# Patient Record
Sex: MALE | HISPANIC OR LATINO | Employment: PART TIME | URBAN - METROPOLITAN AREA
[De-identification: names, ages, dates, MRNs, and addresses within clinical notes are randomized per-mention and may not be internally consistent; named-entity substitution may affect disease eponyms.]

---

## 2017-09-20 ENCOUNTER — HOSPITAL ENCOUNTER (EMERGENCY)
Facility: MEDICAL CENTER | Age: 37
End: 2017-09-20
Attending: EMERGENCY MEDICINE
Payer: COMMERCIAL

## 2017-09-20 VITALS
SYSTOLIC BLOOD PRESSURE: 134 MMHG | RESPIRATION RATE: 18 BRPM | OXYGEN SATURATION: 90 % | WEIGHT: 174.6 LBS | TEMPERATURE: 99.3 F | BODY MASS INDEX: 29.09 KG/M2 | HEIGHT: 65 IN | DIASTOLIC BLOOD PRESSURE: 73 MMHG | HEART RATE: 93 BPM

## 2017-09-20 DIAGNOSIS — L50.9 HIVES: ICD-10-CM

## 2017-09-20 DIAGNOSIS — T78.40XA ALLERGIC REACTION, INITIAL ENCOUNTER: ICD-10-CM

## 2017-09-20 PROCEDURE — 700102 HCHG RX REV CODE 250 W/ 637 OVERRIDE(OP): Performed by: EMERGENCY MEDICINE

## 2017-09-20 PROCEDURE — 99284 EMERGENCY DEPT VISIT MOD MDM: CPT

## 2017-09-20 PROCEDURE — 700111 HCHG RX REV CODE 636 W/ 250 OVERRIDE (IP): Performed by: EMERGENCY MEDICINE

## 2017-09-20 PROCEDURE — 36415 COLL VENOUS BLD VENIPUNCTURE: CPT

## 2017-09-20 PROCEDURE — A9270 NON-COVERED ITEM OR SERVICE: HCPCS | Performed by: EMERGENCY MEDICINE

## 2017-09-20 RX ORDER — DIPHENHYDRAMINE HCL 25 MG
25 TABLET ORAL ONCE
Status: COMPLETED | OUTPATIENT
Start: 2017-09-20 | End: 2017-09-20

## 2017-09-20 RX ORDER — PREDNISONE 20 MG/1
40 TABLET ORAL ONCE
Status: COMPLETED | OUTPATIENT
Start: 2017-09-20 | End: 2017-09-20

## 2017-09-20 RX ORDER — PREDNISONE 20 MG/1
40 TABLET ORAL DAILY
Qty: 8 TAB | Refills: 0 | Status: SHIPPED | OUTPATIENT
Start: 2017-09-20 | End: 2017-09-24

## 2017-09-20 RX ADMIN — PREDNISONE 40 MG: 20 TABLET ORAL at 04:20

## 2017-09-20 RX ADMIN — DIPHENHYDRAMINE HCL 25 MG: 25 TABLET ORAL at 04:20

## 2017-09-20 ASSESSMENT — LIFESTYLE VARIABLES
AVERAGE NUMBER OF DAYS PER WEEK YOU HAVE A DRINK CONTAINING ALCOHOL: 1
TOTAL SCORE: 0
TOTAL SCORE: 0
ON A TYPICAL DAY WHEN YOU DRINK ALCOHOL HOW MANY DRINKS DO YOU HAVE: 1
TOTAL SCORE: 0
HAVE PEOPLE ANNOYED YOU BY CRITICIZING YOUR DRINKING: NO
DO YOU DRINK ALCOHOL: YES
HOW MANY TIMES IN THE PAST YEAR HAVE YOU HAD 5 OR MORE DRINKS IN A DAY: 0
CONSUMPTION TOTAL: NEGATIVE
HAVE YOU EVER FELT YOU SHOULD CUT DOWN ON YOUR DRINKING: NO
EVER FELT BAD OR GUILTY ABOUT YOUR DRINKING: NO
EVER HAD A DRINK FIRST THING IN THE MORNING TO STEADY YOUR NERVES TO GET RID OF A HANGOVER: NO

## 2017-09-20 ASSESSMENT — ENCOUNTER SYMPTOMS
SORE THROAT: 1
NAUSEA: 0
HEADACHES: 1
FEVER: 1
WHEEZING: 0
ABDOMINAL PAIN: 0
SHORTNESS OF BREATH: 0
VOMITING: 0

## 2017-09-20 ASSESSMENT — PAIN SCALES - GENERAL
PAINLEVEL_OUTOF10: 7
PAINLEVEL_OUTOF10: 7

## 2017-09-20 NOTE — DISCHARGE INSTRUCTIONS
Please follow up with a primary physician for blood pressure management, diabetic screening, and all other preventive health concerns.    Alergias  (Allergies)  Reyna alergia es reyna reacción anormal del sistema de defensa del cuerpo (sistema inmunitario) ante reyna sustancia. Las alergias pueden aparecer a cualquier edad.  ¿CUÁLES SON LAS CAUSAS DE LAS ALERGIAS?  La reacción alérgica se produce cuando el sistema inmunitario, por equivocación, reacciona ante reyna sustancia normalmente inocua, llamada alérgeno, zane si fuera perjudicial. El sistema inmunitario libera anticuerpos para combatir la sustancia. Con el tiempo, los anticuerpos liberan reyna sustancia química llamada histamina en el torrente sanguíneo. La liberación de histamina tiene zane fin proteger al cuerpo de la infección, michaela también causa molestias.  Cualquiera de estas acciones puede desencadenar reyna reacción alérgica:  · [place] un alérgeno.  · Inhalar un alérgeno.  · Tocar un alérgeno.  ¿CUÁLES SON LAS CLASES DE ALERGIAS?  Hay muchas clases de alergias. Entre las más frecuentes, se incluyen las siguientes:  · Alergias estacionales. Por lo general, esta clase de alergia se produce por sustancias que solo están presentes cadence determinadas estaciones, por ejemplo, el moho y el polen.  · Alergias a los alimentos.  · Alergias a los medicamentos.  · Alergias a los insectos.  · Alergias a la caspa de los animales.  ¿CUÁLES SON LOS SÍNTOMAS DE LAS ALERGIAS?  Entre los posibles síntomas de la alergia, se incluyen los siguientes:  · Hinchazón de los labios, la slim, la lengua, la boca o la garganta.  · Estornudos, tos o sibilancias.  · Congestión nasal.  · Hormigueo en la boca.  · Erupción cutánea.  · Picazón.  · Zonas de piel hinchadas, mckeon y que producen picazón (ronchas).  · Lagrimeo.  · Vómitos.  · Diarrea.  · Mareos.  · Sensación de desvanecimiento.  · Desmayos.  · Problemas para respirar o tragar.  · Opresión en el pecho.  · Latidos cardíacos  rápidos.  ¿CÓMO SE DIAGNOSTICAN LAS ALERGIAS?  Las alergias se diagnostican en función de los antecedentes médicos y familiares, y mediante hung o más de estos elementos:  · Pruebas cutáneas.  · Análisis de jessica.  · Un registro de alimentos. Un registro de alimentos incluye todos los alimentos y las bebidas que usted consume en un día, y todos los síntomas que experimenta.  · Los resultados de reyna dieta de eliminación. Reyna dieta de eliminación implica eliminar alimentos de la dieta y luego incorporarlos nuevamente, hung a la vez, para averiguar si hay hung en particular que le cause reyna reacción alérgica.  ¿CÓMO SE TRATAN LAS ALERGIAS?  No hay reyna emre para las alergias, michaela las reacciones alérgicas pueden tratarse con medicamentos. Generalmente, las reacciones graves deben tratarse en un hospital.  ¿CÓMO PUEDEN PREVENIRSE LAS REACCIONES?  La mejor manera de prevenir reyna reacción alérgica es evitar la sustancia que le causa alergia. Las vacunas y los medicamentos para la alergia también pueden ayudar a prevenir las reacciones en algunos casos. Las personas con reacciones alérgicas graves pueden prevenir reyna reacción potencialmente mortal llamada anafilaxis con la administración inmediata de un medicamento después de la exposición al alérgeno.     Esta información no tiene zane fin reemplazar el consejo del médico. Asegúrese de hacerle al médico cualquier pregunta que tenga.     Document Released: 12/18/2006 Document Revised: 01/08/2016  Elsevier Interactive Patient Education ©2016 Elsevier Inc.

## 2017-09-20 NOTE — ED NOTES
".  Chief Complaint   Patient presents with   • Allergic Reaction     Itching hives*4 weeks all over body.       Denies SOB, no airway compromise, speaking in full sentences.  Denies change in detergents or soaps, but states that he \"began eating more vegetables 4 weeks ago.\"    Patient educated on triage process and wait time.  Instructed to notify staff for worsening or new symptoms.  Placed in lobby.    "

## 2017-09-20 NOTE — ED PROVIDER NOTES
ED Provider Note    Scribed for Melissa Jose D.O. by Bernadine Atwood. 9/20/2017, 4:03 AM.    Primary care provider: None  Means of arrival: Walk in  History obtained from: Patient  History limited by: None    CHIEF COMPLAINT  Chief Complaint   Patient presents with   • Rash       HPI  Asher Giles is a 37 y.o. male who presents to the Emergency Department for a rash with an onset of 4 weeks.  Patient has experienced an intermittent rash for the past month.  He woke up this morning with a diffuse, erythematous and itchy rash. Associated symptoms include fever, sore throat and headache. Headache last night was treated with children's Ibuprofen.  Patient has experienced a rash with and without taking the medication however. Negative for difficulty breathing or pharyngeal swelling.  He denies exposure to new foods, medications, animals, detergents or soaps. However he does state that he has been eating more vegetables lately. He reports similar symptoms in the past and also not knowing what caused them.      REVIEW OF SYSTEMS  Review of Systems   Constitutional: Positive for fever.   HENT: Positive for sore throat.         Negative pharyngeal swelling.   Respiratory: Negative for shortness of breath and wheezing.         Negative difficulty breathing.   Cardiovascular: Negative for chest pain.   Gastrointestinal: Negative for abdominal pain, nausea and vomiting.   Genitourinary: Negative for dysuria.   Skin: Positive for itching and rash (diffuse, erythematous).   Neurological: Positive for headaches.   All other systems reviewed and are negative.       See HPI for further details. C.      PAST MEDICAL HISTORY  Patient has a past medical history of Acid reflux.      SURGICAL HISTORY  Parent denies a pertinent surgical history.      SOCIAL HISTORY  Social History   Substance Use Topics   • Smoking status: Never Smoker   • Smokeless tobacco: Never Used   • Alcohol use No      Comment: occasional      History   Drug Use  "No       FAMILY HISTORY  History reviewed. No pertinent family history.      CURRENT MEDICATIONS  Patient reports taking Children's Ibuprofen.       ALLERGIES  None      PHYSICAL EXAM  VITAL SIGNS: /73   Pulse 86   Temp 37.4 °C (99.3 °F) (Oral)   Resp 18   Ht 1.651 m (5' 5\")   Wt 79.2 kg (174 lb 9.7 oz)   SpO2 91%   BMI 29.06 kg/m²     Vitals reviewed.    Constitutional: Patient is oriented to person, place, and time. Appears well-developed and well-nourished. No distress.    Head: Normocephalic and atraumatic.   Ears: Normal external ears bilaterally.   Mouth/Throat: Oropharynx is clear and moist, no exudates.   Eyes: Conjunctivae are normal. Pupils are equal, round, and reactive to light.   Neck: Normal range of motion. Neck supple.  Cardiovascular: Normal rate, regular rhythm and normal heart sounds. Normal peripheral pulses.  Pulmonary/Chest: Effort normal and breath sounds normal. No respiratory distress, no wheezes, rhonchi, or rales.  Abdominal: Soft. Bowel sounds are normal. There is no tenderness, rebound or guarding, or peritoneal signs  Musculoskeletal: No edema and no tenderness.   Neurological: No focal deficits.   Skin: Skin is warm and dry. Scattered hives over trunk. No pallor.   Psychiatric: Patient has a normal mood and affect.       COURSE & MEDICAL DECISION MAKING  Pertinent Labs & Imaging studies reviewed. (See chart for details)    4:00 AM Reviewed patient's electronic medical record which indicates an ED visit in 2014 for atypical chest pain, headache and anxiety.    4:04 AM Patient seen and examined at bedside. Patient presents for a rash.  Exam indicates scattered hives over trunk with no airway or MM involvement.      Difficulty to determine the allergen in which the patient is allergic to. Patient has experienced an intermittent rash after taking children's Ibuprofen. Patient may have an allergy to the color dye.    Initial treatment in the Emergency Department included 25 mg " "of Benadryl IV and 40 mg of Prednisone PO.  Patient verbalized their understanding and agreement to this plan. At this time the patient is no distress. No respiratory SX. SX have been on/off for a 4 weeks. Oral therapy appropriate at this time. I have advised him to scour his environment, recent eating, medications to help determine the cause.    Discharge plan was discussed with the patient and includes following up with his physician in two days.  Patient will be discharged with a prescription for Prednisone. Benadryl is recommended for itching.      The patient will return for new or persisting symptoms including difficulty breathing or pharyngeal swelling.  The patient verbalizes understanding and will comply.  Patient is stable at the time of discharge.  Vital signs were reviewed: /73   Pulse 86   Temp 37.4 °C (99.3 °F) (Oral)   Resp 18   Ht 1.651 m (5' 5\")   Wt 79.2 kg (174 lb 9.7 oz)   SpO2 91%   BMI 29.06 kg/m²        DISPOSITION  Patient will be discharged home in stable condition.      FOLLOW UP  Your Physician  Varies    In 2 days      [facility], Emergency Dept  [address] [place] [id number] [phone]    If symptoms worsen      The patient is referred to a primary physician for blood pressure management, diabetic screening, and for all other preventative health concerns.      OUTPATIENT MEDICATIONS  New Prescriptions    PREDNISONE (DELTASONE) 20 MG TAB    Take 2 Tabs by mouth every day for 4 days.       DIAGNOSIS  1. Allergic reaction, initial encounter    2. Hives           The note accurately reflects work and decisions made by me.  Melissa Jose  9/20/2017  4:30 AM     Bernadine OCONNELL (Lidia), am scribing for, and in the presence of, Melissa Jose D.O.    Electronically signed by: Bernadine Atwood (Lidia), 9/20/2017    Melissa OCONNELL D.O. personally performed the services described in this documentation, as scribed by Bernadine Atwood in my presence, " and it is both accurate and complete.

## 2017-09-20 NOTE — ED NOTES
Asher Giles discharged via ambulation with family.  Discharge instructions given and reviewed, patient educated to follow up with PCP, verbalized understanding.  Prescriptions given x 1.  All personal belongings in possession.  No questions at this time.

## 2017-09-20 NOTE — ED NOTES
Pt ambulatory to Red 1.  Agree with triage assessment.  Pt changed into gown.  Chart up for ERP.

## 2022-04-11 ENCOUNTER — HOSPITAL ENCOUNTER (EMERGENCY)
Facility: MEDICAL CENTER | Age: 42
End: 2022-04-11
Attending: EMERGENCY MEDICINE
Payer: COMMERCIAL

## 2022-04-11 VITALS
BODY MASS INDEX: 31.4 KG/M2 | SYSTOLIC BLOOD PRESSURE: 119 MMHG | WEIGHT: 188.49 LBS | TEMPERATURE: 98 F | HEART RATE: 77 BPM | DIASTOLIC BLOOD PRESSURE: 60 MMHG | RESPIRATION RATE: 18 BRPM | HEIGHT: 65 IN | OXYGEN SATURATION: 98 %

## 2022-04-11 DIAGNOSIS — S61.210A LACERATION OF RIGHT INDEX FINGER WITHOUT FOREIGN BODY WITHOUT DAMAGE TO NAIL, INITIAL ENCOUNTER: ICD-10-CM

## 2022-04-11 PROCEDURE — 303353 HCHG DERMABOND SKIN ADHESIVE

## 2022-04-11 PROCEDURE — 700111 HCHG RX REV CODE 636 W/ 250 OVERRIDE (IP): Performed by: EMERGENCY MEDICINE

## 2022-04-11 PROCEDURE — 304999 HCHG REPAIR-SIMPLE/INTERMED LEVEL 1

## 2022-04-11 PROCEDURE — 99282 EMERGENCY DEPT VISIT SF MDM: CPT

## 2022-04-11 PROCEDURE — 90471 IMMUNIZATION ADMIN: CPT

## 2022-04-11 PROCEDURE — 90715 TDAP VACCINE 7 YRS/> IM: CPT | Performed by: EMERGENCY MEDICINE

## 2022-04-11 RX ADMIN — CLOSTRIDIUM TETANI TOXOID ANTIGEN (FORMALDEHYDE INACTIVATED), CORYNEBACTERIUM DIPHTHERIAE TOXOID ANTIGEN (FORMALDEHYDE INACTIVATED), BORDETELLA PERTUSSIS TOXOID ANTIGEN (GLUTARALDEHYDE INACTIVATED), BORDETELLA PERTUSSIS FILAMENTOUS HEMAGGLUTININ ANTIGEN (FORMALDEHYDE INACTIVATED), BORDETELLA PERTUSSIS PERTACTIN ANTIGEN, AND BORDETELLA PERTUSSIS FIMBRIAE 2/3 ANTIGEN 0.5 ML: 5; 2; 2.5; 5; 3; 5 INJECTION, SUSPENSION INTRAMUSCULAR at 20:19

## 2022-04-11 NOTE — LETTER
"  FORM C-4:  EMPLOYEE’S CLAIM FOR COMPENSATION/ REPORT OF INITIAL TREATMENT  EMPLOYEE’S CLAIM - PROVIDE ALL INFORMATION REQUESTED   First Name Asher Last Name Chan Birthdate 1980  Sex male Claim Number   Home Address [address] [place] [address]             Zip [address]                                   Age  41 y.o. Height  1.651 m (5' 5\") Weight  85.5 kg (188 lb 7.9 oz) N  [id number]   Mailing Address [address] [place] [address]              Zip [address] Telephone  [phone] (home)  Primary Language Spoken   Insurer   Third Party    Employee's Occupation (Job Title) When Injury or Occupational Disease Occurred  [employer]   Employer's Name [employer] Telephone [phone]    Employer Address [address] [facility] [address]] Zip [address]   Date of Injury  4/11/2022       Hour of Injury  3:00 PM Date Employer Notified  4/11/2022 Last Day of Work after Injury or Occupational Disease  4/11/2022 Supervisor to Whom Injury Reported  BARTOLOME   Address or Location of Accident (if applicable) Work [1]   What were you doing at the time of accident? (if applicable) OPENING A BAG    How did this injury or occupational disease occur? Be specific and answer in detail. Use additional sheet if necessary)  I WAS OPENING A BAG OF ORANGE CHICKEN WITH A KNIFE,  GOT STUCK AND I DON'T REALLY KNOW HOW I PLACE MY  HAND   If you believe that you have an occupational disease, when did you first have knowledge of the disability and it relationship to your employment? N/A Witnesses to the Accident  NOBODY   Nature of Injury or Occupational Disease  Workers' Compensation Part(s) of Body Injured or Affected  Finger (R), N/A, N/A    I CERTIFY THAT THE ABOVE IS TRUE AND CORRECT TO THE BEST OF MY KNOWLEDGE AND THAT I HAVE PROVIDED THIS INFORMATION IN ORDER TO OBTAIN THE BENEFITS OF [place] IN[place] OR CHAPTER 617 OF " [place]).  I HEREBY AUTHORIZE ANY PHYSICIAN, CHIROPRACTOR, SURGEON, PRACTITIONER, OR OTHER PERSON, ANY HOSPITAL, INCLUDING [facility] OR [facility], ANY MEDICAL SERVICE ORGANIZATION, ANY INSURANCE COMPANY, OR OTHER INSTITUTION OR ORGANIZATION TO RELEASE TO EACH OTHER, ANY MEDICAL OR OTHER INFORMATION, INCLUDING BENEFITS PAID OR PAYABLE, PERTINENT TO THIS INJURY OR DISEASE, EXCEPT INFORMATION RELATIVE TO DIAGNOSIS, TREATMENT AND/OR COUNSELING FOR AIDS, PSYCHOLOGICAL CONDITIONS, ALCOHOL OR CONTROLLED SUBSTANCES, FOR WHICH I MUST GIVE SPECIFIC AUTHORIZATION.  A PHOTOSTAT OF THIS AUTHORIZATION SHALL BE AS VALID AS THE ORIGINAL.  Date 04/11/2022                [facility]                  Employee’s Signature   THIS REPORT MUST BE COMPLETED AND MAILED WITHIN 3 WORKING DAYS OF TREATMENT   Place [facility], EMERGENCY DEPT                       Name of Facility [facility]   Date  4/11/2022 Diagnosis  (S61.210A) Laceration of right index finger without foreign body without damage to nail, initial encounter Is there evidence the injured employee was under the influence of alcohol and/or another controlled substance at the time of accident?   Hour  8:18 PM Description of Injury or Disease  Laceration of right index finger without foreign body without damage to nail, initial encounter No   Treatment  Dermabond  Have you advised the patient to remain off work five days or more?         No   X-Ray Findings    Comments:Not applicable If Yes   From Date    To Date      From information given by the employee, together with medical evidence, can you directly connect this injury or occupational disease as job incurred? Yes If No, is employee capable of: Full Duty  No Modified Duty  Yes   Is additional medical care by a physician indicated? Yes If Modified Duty, Specify any Limitations / Restrictions   No use of right hand until 4/15/2022   Do you know  "of any previous injury or disease contributing to this condition or occupational disease? No    Date 4/11/2022 Print Doctor’s Name Vikas Roman certify the employer’s copy of this form was mailed on:   Address [address] [place] [address]  [place] [place]RAMER [id number] [phone] INSURER’S USE ONLY   Provider’s Tax ID Number [id number] Telephone Dept: [phone]    Doctor’s Signature agueda-VIKAS Khoury D.O. Degree  MD      Form C-4 (rev.10/07)                                                                         BRIEF DESCRIPTION OF RIGHTS AND BENEFITS  (Pursuant to [place] 616[place].050)    Notice of Injury or Occupational Disease (Incident Report Form C-1): If an injury or occupational disease (OD) arises out of and in the course of employment, you must provide written notice to your employer as soon as practicable, but no later than 7 days after the accident or OD. Your employer shall maintain a sufficient supply of the required forms.    Claim for Compensation (Form C-4): If medical treatment is sought, the form C-4 is available at the place of initial treatment. A completed \"Claim for Compensation\" (Form C-4) must be filed within 90 days after an accident or OD. The treating physician or chiropractor must, within 3 working days after treatment, complete and mail to the employer, the employer's insurer and third-party , the Claim for Compensation.    Medical Treatment: If you require medical treatment for your on-the-job injury or OD, you may be required to select a physician or chiropractor from a list provided by your workers’ compensation insurer, if it has contracted with an Organization for Managed Care (MCO) or Preferred Provider Organization (PPO) or providers of health care. If your employer has not entered into a contract with an MCO or PPO, you may select a physician or chiropractor from the Panel of Physicians and Chiropractors. Any medical costs related to your industrial injury or OD will be paid " by your insurer.    Temporary Total Disability (TTD): If your doctor has certified that you are unable to work for a period of at least 5 consecutive days, or 5 cumulative days in a 20-day period, or places restrictions on you that your employer does not accommodate, you may be entitled to TTD compensation.    Temporary Partial Disability (TPD): If the wage you receive upon reemployment is less than the compensation for TTD to which you are entitled, the insurer may be required to pay you TPD compensation to make up the difference. TPD can only be paid for a maximum of 24 months.    Permanent Partial Disability (PPD): When your medical condition is stable and there is an indication of a PPD as a result of your injury or OD, within 30 days, your insurer must arrange for an evaluation by a rating physician or chiropractor to determine the degree of your PPD. The amount of your PPD award depends on the date of injury, the results of the PPD evaluation, your age and wage.    Permanent Total Disability (PTD): If you are medically certified by a treating physician or chiropractor as permanently and totally disabled and have been granted a PTD status by your insurer, you are entitled to receive monthly benefits not to exceed 66 2/3% of your average monthly wage. The amount of your PTD payments is subject to reduction if you previously received a lump-sum PPD award.    Vocational Rehabilitation Services: You may be eligible for vocational rehabilitation services if you are unable to return to the job due to a permanent physical impairment or permanent restrictions as a result of your injury or occupational disease.    Transportation and Per Martha Reimbursement: You may be eligible for travel expenses and per martha associated with medical treatment.    Reopening: You may be able to reopen your claim if your condition worsens after claim closure.     Appeal Process: If you disagree with a written determination issued by the  insurer or the insurer does not respond to your request, you may appeal to the Department of Administration, , by following the instructions contained in your determination letter. You must appeal the determination within 70 days from the date of the determination letter at [address], [address], [place], [place] [address], or [address], [address], [place], [place] [address]. If you disagree with the  decision, you may appeal to the Department of Administration, . You must file your appeal within 30 days from the date of the  decision letter at [address], [address], [place], [place] [address], or [address], [address], [place], [place] [address]. If you disagree with a decision of an , you may file a petition for judicial review with the District Court. You must do so within 30 days of the Appeal Officer’s decision. You may be represented by an  at your own expense or you may contact the [facility] for possible representation.    [place]  for Injured Workers (NAIW): If you disagree with a  decision, you may request that NAIW represent you without charge at an  Hearing. For information regarding denial of benefits, you may contact the NA at: [address], [address], [place] [address], [phone], or [address], [place] [address], [phone]    To File a Complaint with the Division: If you wish to file a complaint with the  of the Division of Industrial Relations (DIR),  please contact the Workers’ Compensation Section, [address], [address], [place], [place] [address], telephone [phone], or [address], [address], [place], [place] [address], telephone [phone].    For assistance with Workers’ Compensation Issues: You may contact the [facility] Office for Consumer Health  Assistance, [address] [place] [address], [facility] [address], [place] [address], Toll Free 1-483.340.4484, Web site: http://[facility].[place].gov/Programs/ADAM E-mail: adam@[facility].[place].gov  D-2 (rev. 10/20)              __________________________________________________________________                                    _________________            Employee Name / Signature                                                                                                                            Date

## 2022-04-12 NOTE — DISCHARGE INSTRUCTIONS
You will go back to work with no use of the right hand for 3 days.  You may use soap and water on this wound, do not put any Band-Aids on it do not use lotion.  See Workmen's Comp. clinic in 3 days for reevaluation

## 2022-04-12 NOTE — ED TRIAGE NOTES
"Chief Complaint   Patient presents with   • Finger Injury     Right hand finger laceration     Patient presents with complaints of right hand finger laceration. Patient was opening a bag and cut himself with a knife.    Last TDap approx. 11 years ago.    Wound to right index finger, wrapped in gauze and taped, bleeding controlled.    /90   Pulse 78   Temp 36.6 °C (97.8 °F) (Temporal)   Resp 19   Ht 1.651 m (5' 5\")   Wt 85.5 kg (188 lb 7.9 oz)   SpO2 97%     "

## 2022-04-12 NOTE — ED PROVIDER NOTES
"ED Provider  Scribed for Gil Roman D.O. by Magdalena Coyle. 4/11/2022  7:59 PM    Means of arrival: Walk-in  History obtained from: Patient  History limited by: None    CHIEF COMPLAINT  Chief Complaint   Patient presents with    Finger Injury     Right hand finger laceration       HPI  Asher Giles is a 41 y.o. male who presents for a right index finger laceration onset prior to arrival. He states he was opening a bag at work with a knife when he slipped and cut his finger. He was able to control the bleeding quickly. He denies decreased sensation in the finger. He does not believe his tetanus is up to date. He is right handed. He is otherwise healthy.     REVIEW OF SYSTEMS  See HPI for further details.     PAST MEDICAL HISTORY   has a past medical history of Acid reflux.    SOCIAL HISTORY  Social History     Tobacco Use    Smoking status: Never Smoker    Smokeless tobacco: Never Used   Substance and Sexual Activity    Alcohol use: No     Comment: occasional    Drug use: No       SURGICAL HISTORY  patient denies any surgical history    CURRENT MEDICATIONS  No current outpatient medications    ALLERGIES  No Known Allergies    PHYSICAL EXAM  VITAL SIGNS: /90   Pulse 78   Temp 36.6 °C (97.8 °F) (Temporal)   Resp 19   Ht 1.651 m (5' 5\")   Wt 85.5 kg (188 lb 7.9 oz)   SpO2 97%   BMI 31.37 kg/m²   Constitutional: Alert in no apparent distress.  HENT: Normocephalic, Atraumatic, mucous membranes are moist.   Eyes: Conjunctiva normal, non-icteric.   Heart: No peripheral cyanosis   Lungs: Respirations are even and unlabored   Skin: 1.5 cm flap laceration to the lateral aspect of the phalanx. Distal neurovascular is normal.  Neurologic: Alert, Grossly non-focal.   Psychiatric: Affect normal, Judgment normal, Mood normal, Appears appropriate and not intoxicated.     COURSE  Pertinent Labs & Imaging studies reviewed. (See chart for details)    7:59 PM - Patient seen and examined at bedside. Performed " laceration repair as noted below. Treated patient with Adacel 0.5 ml.    Laceration Repair Procedure Note    Indication: Laceration    Procedure: The patient was placed in the appropriate position. The area was cleansed thoroughly with soap and water. The laceration was closed with Dermabond. There were no additional lacerations requiring repair.     Total repaired wound length: 1.5 cm.     Other Items: None    The patient tolerated the procedure well.    Complications: None     I discussed the plan for discharge at this time. I informed the patient he can wash the area with soap and water, but he cannot put lotion or a bandaid on the area. Patient verbalizes understanding and agreement to this plan of care.       MEDICAL DECISION MAKING  This is a 41 y.o. male who presents with complaints of laceration of the finger and happened while at work, he was opening a package with a knife and slipped and cut the finger.    The wounds edges are well approximated.  Is in the distal area not affected by the joints.  Distal neurovascular is normal.  Dermabond closure was done and he is discharged to return to work with no use that hand for 3 days to allow healing to occur.     The patient will return for new or worsening symptoms and is stable at the time of discharge.    The patient is referred to a primary physician for blood pressure management, diabetic screening, and for all other preventative health concerns.      DISPOSITION:  Patient will be discharged home in stable condition.    FOLLOW UP:    See Workmen's Comp. clinic in 3 days for reevaluation  In 3 days    FINAL IMPRESSION  1. Laceration of right index finger without foreign body without damage to nail, initial encounter         Magdalena OCONNELL), am scribing for, and in the presence of, Gil Roman D.O..    Electronically signed by: Magdalena Andre), 4/11/2022    Gil OCONNELL D.O. personally performed the services described in this  documentation, as scribed by Magdalena Coyle in my presence, and it is both accurate and complete.    The note accurately reflects work and decisions made by me.  Gil Roman D.O.  4/11/2022  9:52 PM